# Patient Record
Sex: FEMALE | Race: BLACK OR AFRICAN AMERICAN | Employment: PART TIME | URBAN - METROPOLITAN AREA
[De-identification: names, ages, dates, MRNs, and addresses within clinical notes are randomized per-mention and may not be internally consistent; named-entity substitution may affect disease eponyms.]

---

## 2017-01-11 ENCOUNTER — HOSPITAL ENCOUNTER (OUTPATIENT)
Dept: MAMMOGRAPHY | Age: 72
Discharge: HOME | End: 2017-01-11
Attending: FAMILY MEDICINE
Payer: MEDICARE

## 2017-01-11 DIAGNOSIS — Z12.31 VISIT FOR SCREENING MAMMOGRAM: ICD-10-CM

## 2017-01-11 PROCEDURE — 77063 BREAST TOMOSYNTHESIS BI: CPT

## 2017-07-06 ENCOUNTER — HOSPITAL ENCOUNTER (OUTPATIENT)
Dept: MAMMOGRAPHY | Age: 72
Discharge: HOME | End: 2017-07-06
Attending: FAMILY MEDICINE
Payer: MEDICARE

## 2017-07-06 DIAGNOSIS — Z78.0 MENOPAUSE: ICD-10-CM

## 2017-07-06 PROCEDURE — 77080 DXA BONE DENSITY AXIAL: CPT

## 2018-03-01 ENCOUNTER — HOSPITAL ENCOUNTER (OUTPATIENT)
Dept: MAMMOGRAPHY | Age: 73
Discharge: HOME | End: 2018-03-01
Attending: FAMILY MEDICINE
Payer: MEDICARE

## 2018-03-01 DIAGNOSIS — Z12.31 VISIT FOR SCREENING MAMMOGRAM: ICD-10-CM

## 2018-03-01 PROCEDURE — 77063 BREAST TOMOSYNTHESIS BI: CPT

## 2018-04-30 ENCOUNTER — HOSPITAL ENCOUNTER (OUTPATIENT)
Dept: GENERAL RADIOLOGY | Age: 73
Discharge: HOME | End: 2018-04-30
Payer: MEDICARE

## 2018-04-30 DIAGNOSIS — M79.642 LEFT HAND PAIN: ICD-10-CM

## 2018-04-30 DIAGNOSIS — M79.641 RIGHT HAND PAIN: ICD-10-CM

## 2018-04-30 PROCEDURE — 72052 X-RAY EXAM NECK SPINE 6/>VWS: CPT

## 2018-04-30 PROCEDURE — 73130 X-RAY EXAM OF HAND: CPT

## 2018-06-19 ENCOUNTER — HOSPITAL ENCOUNTER (OUTPATIENT)
Dept: PREADMISSION TESTING | Age: 73
Discharge: HOME | End: 2018-06-19
Payer: MEDICARE

## 2018-06-19 DIAGNOSIS — Z01.818 PRE-OP TESTING: ICD-10-CM

## 2018-06-19 LAB
ALBUMIN SERPL-MCNC: 3.9 G/DL (ref 3.4–5)
ALBUMIN/GLOB SERPL: 1.1 {RATIO} (ref 0.8–1.7)
ALP SERPL-CCNC: 73 U/L (ref 45–117)
ALT SERPL-CCNC: 26 U/L (ref 13–56)
ANION GAP SERPL CALC-SCNC: 9 MMOL/L (ref 3–18)
AST SERPL-CCNC: 25 U/L (ref 15–37)
ATRIAL RATE: 57 BPM
BILIRUB SERPL-MCNC: 0.5 MG/DL (ref 0.2–1)
BUN SERPL-MCNC: 16 MG/DL (ref 7–18)
BUN/CREAT SERPL: 16 (ref 12–20)
CALCIUM SERPL-MCNC: 9.5 MG/DL (ref 8.5–10.1)
CALCULATED P AXIS, ECG09: 87 DEGREES
CALCULATED R AXIS, ECG10: 73 DEGREES
CALCULATED T AXIS, ECG11: 105 DEGREES
CHLORIDE SERPL-SCNC: 102 MMOL/L (ref 100–108)
CO2 SERPL-SCNC: 32 MMOL/L (ref 21–32)
CREAT SERPL-MCNC: 0.97 MG/DL (ref 0.6–1.3)
DIAGNOSIS, 93000: NORMAL
ERYTHROCYTE [DISTWIDTH] IN BLOOD BY AUTOMATED COUNT: 12.4 % (ref 11.6–14.5)
GLOBULIN SER CALC-MCNC: 3.7 G/DL (ref 2–4)
GLUCOSE SERPL-MCNC: 89 MG/DL (ref 74–99)
HCT VFR BLD AUTO: 38.5 % (ref 35–45)
HGB BLD-MCNC: 12.6 G/DL (ref 12–16)
MCH RBC QN AUTO: 31 PG (ref 24–34)
MCHC RBC AUTO-ENTMCNC: 32.7 G/DL (ref 31–37)
MCV RBC AUTO: 94.8 FL (ref 74–97)
P-R INTERVAL, ECG05: 216 MS
PLATELET # BLD AUTO: 202 K/UL (ref 135–420)
PMV BLD AUTO: 10 FL (ref 9.2–11.8)
POTASSIUM SERPL-SCNC: 3.2 MMOL/L (ref 3.5–5.5)
PROT SERPL-MCNC: 7.6 G/DL (ref 6.4–8.2)
Q-T INTERVAL, ECG07: 452 MS
QRS DURATION, ECG06: 90 MS
QTC CALCULATION (BEZET), ECG08: 439 MS
RBC # BLD AUTO: 4.06 M/UL (ref 4.2–5.3)
SODIUM SERPL-SCNC: 143 MMOL/L (ref 136–145)
VENTRICULAR RATE, ECG03: 57 BPM
WBC # BLD AUTO: 5.5 K/UL (ref 4.6–13.2)

## 2018-06-19 PROCEDURE — 93005 ELECTROCARDIOGRAM TRACING: CPT

## 2018-06-19 PROCEDURE — 36415 COLL VENOUS BLD VENIPUNCTURE: CPT | Performed by: ORTHOPAEDIC SURGERY

## 2018-06-19 PROCEDURE — 85027 COMPLETE CBC AUTOMATED: CPT | Performed by: ORTHOPAEDIC SURGERY

## 2018-06-19 PROCEDURE — 80053 COMPREHEN METABOLIC PANEL: CPT | Performed by: ORTHOPAEDIC SURGERY

## 2018-06-26 PROBLEM — I10 HTN (HYPERTENSION): Chronic | Status: ACTIVE | Noted: 2018-06-26

## 2018-06-26 PROBLEM — E78.00 HIGH CHOLESTEROL: Chronic | Status: ACTIVE | Noted: 2018-06-26

## 2018-06-26 PROBLEM — G56.02 CARPAL TUNNEL SYNDROME OF LEFT WRIST: Chronic | Status: ACTIVE | Noted: 2018-06-26

## 2018-07-26 ENCOUNTER — HOSPITAL ENCOUNTER (OUTPATIENT)
Dept: PREADMISSION TESTING | Age: 73
Discharge: HOME | End: 2018-07-26
Payer: MEDICARE

## 2018-07-26 LAB
ALBUMIN SERPL-MCNC: 3.9 G/DL (ref 3.4–5)
ALBUMIN/GLOB SERPL: 1.1 {RATIO} (ref 0.8–1.7)
ALP SERPL-CCNC: 72 U/L (ref 45–117)
ALT SERPL-CCNC: 28 U/L (ref 13–56)
ANION GAP SERPL CALC-SCNC: 1 MMOL/L (ref 3–18)
AST SERPL-CCNC: 27 U/L (ref 15–37)
BILIRUB SERPL-MCNC: 0.6 MG/DL (ref 0.2–1)
BUN SERPL-MCNC: 17 MG/DL (ref 7–18)
BUN/CREAT SERPL: 16 (ref 12–20)
CALCIUM SERPL-MCNC: 9.8 MG/DL (ref 8.5–10.1)
CHLORIDE SERPL-SCNC: 98 MMOL/L (ref 100–108)
CO2 SERPL-SCNC: 36 MMOL/L (ref 21–32)
CREAT SERPL-MCNC: 1.06 MG/DL (ref 0.6–1.3)
GLOBULIN SER CALC-MCNC: 3.6 G/DL (ref 2–4)
GLUCOSE SERPL-MCNC: 90 MG/DL (ref 74–99)
POTASSIUM SERPL-SCNC: 3.6 MMOL/L (ref 3.5–5.5)
PROT SERPL-MCNC: 7.5 G/DL (ref 6.4–8.2)
SODIUM SERPL-SCNC: 135 MMOL/L (ref 136–145)

## 2018-07-26 PROCEDURE — 36415 COLL VENOUS BLD VENIPUNCTURE: CPT | Performed by: ORTHOPAEDIC SURGERY

## 2018-07-26 PROCEDURE — 80053 COMPREHEN METABOLIC PANEL: CPT | Performed by: ORTHOPAEDIC SURGERY

## 2018-08-07 NOTE — H&P
History and Physical        Patient: Ada Delvalle               Sex: female          DOA: (Not on file)         YOB: 1945      Age:  75 y.o.        LOS:  LOS: 0 days        HPI:     Negro Robles is a 78-year-old right-handed white female referred here  for a left-greater-than-right severe carpal tunnel syndrome. The patient is here today for evaluation and treatment. She has had recent EMGs that show evidence of severe carpal tunnel syndrome bilaterally and evidence of denervation. The left hand is worse than the right. It does wake her up every morning around 4:00. X-rays of the bilateral wrists reviewed from outside show mild evidence of 1st CMC degenerative joint disease. Past Medical History:   Diagnosis Date    Arthritis     Heart murmur     congenital    Hypercholesteremia     Hypertension     early age 44s   [employer] Menopause     Ag 61       Past Surgical History:   Procedure Laterality Date    HX CHOLECYSTECTOMY      HX GYN      ovarian cyst right removed    HX GYN      uterine polyp removed       No family history on file. Social History     Social History    Marital status:      Spouse name: N/A    Number of children: N/A    Years of education: N/A     Social History Main Topics    Smoking status: Never Smoker    Smokeless tobacco: Never Used    Alcohol use 0.6 oz/week     1 Cans of beer per week      Comment: monthly    Drug use: No    Sexual activity: Not on file     Other Topics Concern    Not on file     Social History Narrative       Prior to Admission medications    Medication Sig Start Date End Date Taking? Authorizing Provider   chlorthalidone (HYGROTEN) 25 mg tablet nightly. Indications: hypertension 4/30/18   Historical Provider   ezetimibe (ZETIA) 10 mg tablet nightly. Indications: hypercholesterolemia 4/29/18   Historical Provider   fosinopril (MONOPRIL) 20 mg tablet nightly.  Indications: hypertension 5/31/18   Historical Provider multivitamin, tx-iron-ca-min (THERA-M W/ IRON) 9 mg iron-400 mcg tab tablet Take 1 Tab by mouth daily. Historical Provider   glucosamine-chondroit-vit c-mn (GLUCOSAMINE 1500 COMPLEX) 500-400 mg capsule Take  by mouth daily. Historical Provider   aspirin delayed-release 81 mg tablet Take  by mouth daily. Historical Provider       No Known Allergies    Review of Systems    Pertinent positives include heart murmur, joint stiffness and numbness/tingling. Pertinent negatives include blurred vision, chest pain, chills, cold, discharge of the eyes, dizziness, double vision, fever, headache, hearing loss, itching of the eyes, palpitations, redness of the eyes, rheumatic fever, ringing in ears, sore throat/hoarseness, weight change, abdominal pain, anxiety, bipolar disorder, bladder/kidney infection, bloody stool, blood in urine, burning sensation, changes in mood, chronic cough, depression, diarrhea, difficulty breathing, difficulty swallowing, fainting, frequent urinating, fracture/dislocation, gas/bloating, gout, hemorrhoids, incontinence, joint pain, loss of balance, memory loss, muscle weakness, nausea/vomiting, pain on breathing, painful urination, psoriasis, rash/itching, Raynaud's phenomenon, rheumatoid disease, seizure disorder, shortness of breath, sprain/strain, swelling of feet, tendonitis, varicose veins and wheezing. Physical Exam:      There were no vitals taken for this visit. Physical Exam:  Physical exam shows a healthy-appearing 67-year-old [employer] female. The left hand has decreased light touch sensation in digits 1, 2, 3, and 4. She has a mildly positive Tinel's that is more localized to the palm. She has some atrophy of the thenar eminence. Otherwise, examination shows that the patients left wrist demonstrates no obvious swelling, ecchymosis, or wounds noted. There is no tenderness to palpation anywhere within the wrist or hand.   The patient has normal motion in all six directions. The patient has a negative Phalen and direct carpal compression tests. The patient has a negative Finkelstein maneuver. The patient has good capillary refill. The patient has good  strength of the hand. Assessment/Plan     Principal Problem:    HTN (hypertension) (6/26/2018)    Active Problems:    High cholesterol (6/26/2018)      Carpal tunnel syndrome of left wrist (6/26/2018)      Dr. Lamin Francois asked her to see his  for a left endoscopic carpal tunnel release. He talked to her about the risks, the alternatives, and the benefits including infection, pain, and bleeding, and she is willing to proceed. Dr. Lamin Francois issued Shima Vu for postoperative pain use. He will see her again one week postop. Once we get her left side better enough we will discuss doing the right.

## 2018-08-08 ENCOUNTER — ANESTHESIA EVENT (OUTPATIENT)
Dept: SURGERY | Age: 73
End: 2018-08-08
Payer: MEDICARE

## 2018-08-08 RX ORDER — SODIUM CHLORIDE 0.9 % (FLUSH) 0.9 %
5-10 SYRINGE (ML) INJECTION AS NEEDED
Status: CANCELLED | OUTPATIENT
Start: 2018-08-08

## 2018-08-08 RX ORDER — SODIUM CHLORIDE 0.9 % (FLUSH) 0.9 %
5-10 SYRINGE (ML) INJECTION EVERY 8 HOURS
Status: CANCELLED | OUTPATIENT
Start: 2018-08-08

## 2018-08-08 RX ORDER — DIPHENHYDRAMINE HCL 25 MG
25 CAPSULE ORAL
Status: CANCELLED | OUTPATIENT
Start: 2018-08-08

## 2018-08-09 ENCOUNTER — HOSPITAL ENCOUNTER (OUTPATIENT)
Age: 73
Setting detail: OUTPATIENT SURGERY
Discharge: HOME | End: 2018-08-09
Attending: ORTHOPAEDIC SURGERY | Admitting: ORTHOPAEDIC SURGERY
Payer: MEDICARE

## 2018-08-09 ENCOUNTER — ANESTHESIA (OUTPATIENT)
Dept: SURGERY | Age: 73
End: 2018-08-09
Payer: MEDICARE

## 2018-08-09 VITALS
SYSTOLIC BLOOD PRESSURE: 128 MMHG | TEMPERATURE: 97 F | RESPIRATION RATE: 16 BRPM | HEART RATE: 76 BPM | OXYGEN SATURATION: 97 % | BODY MASS INDEX: 27.92 KG/M2 | DIASTOLIC BLOOD PRESSURE: 60 MMHG | WEIGHT: 177.9 LBS | HEIGHT: 67 IN

## 2018-08-09 PROCEDURE — 76010000154 HC OR TIME FIRST 0.5 HR: Performed by: ORTHOPAEDIC SURGERY

## 2018-08-09 PROCEDURE — 77030032490 HC SLV COMPR SCD KNE COVD -B: Performed by: ORTHOPAEDIC SURGERY

## 2018-08-09 PROCEDURE — 74011250636 HC RX REV CODE- 250/636: Performed by: PHYSICIAN ASSISTANT

## 2018-08-09 PROCEDURE — 74011250636 HC RX REV CODE- 250/636: Performed by: SPECIALIST

## 2018-08-09 PROCEDURE — 76060000031 HC ANESTHESIA FIRST 0.5 HR: Performed by: ORTHOPAEDIC SURGERY

## 2018-08-09 PROCEDURE — 74011000250 HC RX REV CODE- 250

## 2018-08-09 PROCEDURE — 74011000250 HC RX REV CODE- 250: Performed by: ORTHOPAEDIC SURGERY

## 2018-08-09 PROCEDURE — 76210000016 HC OR PH I REC 1 TO 1.5 HR: Performed by: ORTHOPAEDIC SURGERY

## 2018-08-09 PROCEDURE — 76210000021 HC REC RM PH II 0.5 TO 1 HR: Performed by: ORTHOPAEDIC SURGERY

## 2018-08-09 PROCEDURE — 74011250636 HC RX REV CODE- 250/636

## 2018-08-09 PROCEDURE — 77030000032 HC CUF TRNQT ZIMM -B: Performed by: ORTHOPAEDIC SURGERY

## 2018-08-09 PROCEDURE — 74011250637 HC RX REV CODE- 250/637: Performed by: SPECIALIST

## 2018-08-09 PROCEDURE — 77030020782 HC GWN BAIR PAWS FLX 3M -B: Performed by: ORTHOPAEDIC SURGERY

## 2018-08-09 PROCEDURE — 77030009770 HC INSTRU CRPL SET CNMD -C: Performed by: ORTHOPAEDIC SURGERY

## 2018-08-09 RX ORDER — LIDOCAINE HYDROCHLORIDE 20 MG/ML
INJECTION, SOLUTION EPIDURAL; INFILTRATION; INTRACAUDAL; PERINEURAL AS NEEDED
Status: DISCONTINUED | OUTPATIENT
Start: 2018-08-09 | End: 2018-08-09 | Stop reason: HOSPADM

## 2018-08-09 RX ORDER — ONDANSETRON 2 MG/ML
INJECTION INTRAMUSCULAR; INTRAVENOUS AS NEEDED
Status: DISCONTINUED | OUTPATIENT
Start: 2018-08-09 | End: 2018-08-09 | Stop reason: HOSPADM

## 2018-08-09 RX ORDER — DEXAMETHASONE SODIUM PHOSPHATE 4 MG/ML
8 INJECTION, SOLUTION INTRA-ARTICULAR; INTRALESIONAL; INTRAMUSCULAR; INTRAVENOUS; SOFT TISSUE ONCE
Status: COMPLETED | OUTPATIENT
Start: 2018-08-09 | End: 2018-08-09

## 2018-08-09 RX ORDER — ACETAMINOPHEN 500 MG
1000 TABLET ORAL ONCE
Status: COMPLETED | OUTPATIENT
Start: 2018-08-09 | End: 2018-08-09

## 2018-08-09 RX ORDER — SODIUM CHLORIDE, SODIUM LACTATE, POTASSIUM CHLORIDE, CALCIUM CHLORIDE 600; 310; 30; 20 MG/100ML; MG/100ML; MG/100ML; MG/100ML
125 INJECTION, SOLUTION INTRAVENOUS CONTINUOUS
Status: DISCONTINUED | OUTPATIENT
Start: 2018-08-09 | End: 2018-08-09 | Stop reason: HOSPADM

## 2018-08-09 RX ORDER — DEXTROSE 50 % IN WATER (D50W) INTRAVENOUS SYRINGE
25-50 AS NEEDED
Status: DISCONTINUED | OUTPATIENT
Start: 2018-08-09 | End: 2018-08-09 | Stop reason: HOSPADM

## 2018-08-09 RX ORDER — FENTANYL CITRATE 50 UG/ML
50 INJECTION, SOLUTION INTRAMUSCULAR; INTRAVENOUS
Status: DISCONTINUED | OUTPATIENT
Start: 2018-08-09 | End: 2018-08-09 | Stop reason: HOSPADM

## 2018-08-09 RX ORDER — ONDANSETRON 2 MG/ML
4 INJECTION INTRAMUSCULAR; INTRAVENOUS ONCE
Status: DISCONTINUED | OUTPATIENT
Start: 2018-08-09 | End: 2018-08-09 | Stop reason: HOSPADM

## 2018-08-09 RX ORDER — GLYCOPYRROLATE 0.2 MG/ML
INJECTION INTRAMUSCULAR; INTRAVENOUS AS NEEDED
Status: DISCONTINUED | OUTPATIENT
Start: 2018-08-09 | End: 2018-08-09 | Stop reason: HOSPADM

## 2018-08-09 RX ORDER — SODIUM CHLORIDE 0.9 % (FLUSH) 0.9 %
5-10 SYRINGE (ML) INJECTION AS NEEDED
Status: DISCONTINUED | OUTPATIENT
Start: 2018-08-09 | End: 2018-08-09 | Stop reason: HOSPADM

## 2018-08-09 RX ORDER — NALOXONE HYDROCHLORIDE 0.4 MG/ML
0.1 INJECTION, SOLUTION INTRAMUSCULAR; INTRAVENOUS; SUBCUTANEOUS AS NEEDED
Status: DISCONTINUED | OUTPATIENT
Start: 2018-08-09 | End: 2018-08-09 | Stop reason: HOSPADM

## 2018-08-09 RX ORDER — BUPIVACAINE HYDROCHLORIDE 2.5 MG/ML
INJECTION, SOLUTION EPIDURAL; INFILTRATION; INTRACAUDAL AS NEEDED
Status: DISCONTINUED | OUTPATIENT
Start: 2018-08-09 | End: 2018-08-09 | Stop reason: HOSPADM

## 2018-08-09 RX ORDER — FENTANYL CITRATE 50 UG/ML
25 INJECTION, SOLUTION INTRAMUSCULAR; INTRAVENOUS AS NEEDED
Status: DISCONTINUED | OUTPATIENT
Start: 2018-08-09 | End: 2018-08-09 | Stop reason: HOSPADM

## 2018-08-09 RX ORDER — MIDAZOLAM HYDROCHLORIDE 1 MG/ML
INJECTION, SOLUTION INTRAMUSCULAR; INTRAVENOUS AS NEEDED
Status: DISCONTINUED | OUTPATIENT
Start: 2018-08-09 | End: 2018-08-09 | Stop reason: HOSPADM

## 2018-08-09 RX ORDER — SODIUM CHLORIDE 9 MG/ML
125 INJECTION, SOLUTION INTRAVENOUS CONTINUOUS
Status: DISCONTINUED | OUTPATIENT
Start: 2018-08-09 | End: 2018-08-09 | Stop reason: HOSPADM

## 2018-08-09 RX ORDER — MAGNESIUM SULFATE 100 %
4 CRYSTALS MISCELLANEOUS AS NEEDED
Status: DISCONTINUED | OUTPATIENT
Start: 2018-08-09 | End: 2018-08-09 | Stop reason: HOSPADM

## 2018-08-09 RX ORDER — PROPOFOL 10 MG/ML
INJECTION, EMULSION INTRAVENOUS AS NEEDED
Status: DISCONTINUED | OUTPATIENT
Start: 2018-08-09 | End: 2018-08-09 | Stop reason: HOSPADM

## 2018-08-09 RX ADMIN — SODIUM CHLORIDE, SODIUM LACTATE, POTASSIUM CHLORIDE, AND CALCIUM CHLORIDE: 600; 310; 30; 20 INJECTION, SOLUTION INTRAVENOUS at 11:26

## 2018-08-09 RX ADMIN — LIDOCAINE HYDROCHLORIDE 80 MG: 20 INJECTION, SOLUTION EPIDURAL; INFILTRATION; INTRACAUDAL; PERINEURAL at 11:12

## 2018-08-09 RX ADMIN — ONDANSETRON 4 MG: 2 INJECTION INTRAMUSCULAR; INTRAVENOUS at 11:05

## 2018-08-09 RX ADMIN — PROPOFOL 80 MG: 10 INJECTION, EMULSION INTRAVENOUS at 11:12

## 2018-08-09 RX ADMIN — DEXAMETHASONE SODIUM PHOSPHATE 8 MG: 4 INJECTION, SOLUTION INTRAMUSCULAR; INTRAVENOUS at 07:42

## 2018-08-09 RX ADMIN — PROPOFOL 20 MG: 10 INJECTION, EMULSION INTRAVENOUS at 11:10

## 2018-08-09 RX ADMIN — ACETAMINOPHEN 1000 MG: 500 TABLET, FILM COATED ORAL at 07:44

## 2018-08-09 RX ADMIN — SODIUM CHLORIDE, SODIUM LACTATE, POTASSIUM CHLORIDE, AND CALCIUM CHLORIDE 125 ML/HR: 600; 310; 30; 20 INJECTION, SOLUTION INTRAVENOUS at 07:41

## 2018-08-09 RX ADMIN — LIDOCAINE HYDROCHLORIDE 20 MG: 20 INJECTION, SOLUTION EPIDURAL; INFILTRATION; INTRACAUDAL; PERINEURAL at 11:10

## 2018-08-09 RX ADMIN — MIDAZOLAM HYDROCHLORIDE 1 MG: 1 INJECTION, SOLUTION INTRAMUSCULAR; INTRAVENOUS at 11:05

## 2018-08-09 RX ADMIN — GLYCOPYRROLATE 0.2 MG: 0.2 INJECTION INTRAMUSCULAR; INTRAVENOUS at 11:05

## 2018-08-09 NOTE — INTERVAL H&P NOTE
H&P Update:  Jason Carrera was seen and examined. History and physical has been reviewed. The patient has been examined. There have been no significant clinical changes since the completion of the originally dated History and Physical.  Patient identified by surgeon; surgical site was confirmed by patient and surgeon.     Signed By: Alessandra Lucero MD     August 9, 2018 9:53 AM

## 2018-08-09 NOTE — ANESTHESIA POSTPROCEDURE EVALUATION
Post-Anesthesia Evaluation and Assessment    Cardiovascular Function/Vital Signs  Visit Vitals    /67    Pulse 84    Temp 36.2 °C (97.1 °F)    Resp 18    Ht 5' 7\" (1.702 m)    Wt 80.7 kg (177 lb 14.4 oz)    SpO2 100%    BMI 27.86 kg/m2       Patient is status post Procedure(s):  LEFT ENDOSCOPIC CARPAL TUNNEL RELEASE. Nausea/Vomiting: Controlled. Postoperative hydration reviewed and adequate. Pain:  Pain Scale 1: Visual (08/09/18 1150)  Pain Intensity 1: 0 (08/09/18 1150)   Managed. Neurological Status:   Neuro (WDL): Within Defined Limits (08/09/18 1150)   At baseline. Mental Status and Level of Consciousness: Arousable. Pulmonary Status:   O2 Device: Room air (08/09/18 1150)   Adequate oxygenation and airway patent. Complications related to anesthesia: None    Post-anesthesia assessment completed. No concerns. Patient has met all discharge requirements.     Signed By: Lary Lake MD    August 9, 2018

## 2018-08-09 NOTE — ANESTHESIA PREPROCEDURE EVALUATION
Anesthetic History   No history of anesthetic complications     Pertinent negatives: No PONV       Review of Systems / Medical History  Patient summary reviewed, nursing notes reviewed and pertinent labs reviewed    Pulmonary  Within defined limits            Pertinent negatives: No smoker     Neuro/Psych   Within defined limits           Cardiovascular    Hypertension: well controlled                   GI/Hepatic/Renal  Within defined limits              Endo/Other  Within defined limits           Other Findings   Comments: etoh 1/ month           Physical Exam    Airway  Mallampati: IV  TM Distance: 4 - 6 cm  Neck ROM: normal range of motion   Mouth opening: Normal     Cardiovascular               Dental    Dentition: Full upper dentures     Pulmonary                 Abdominal         Other Findings            Anesthetic Plan    ASA: 2  Anesthesia type: general          Induction: Intravenous  Anesthetic plan and risks discussed with: Patient

## 2018-08-09 NOTE — PERIOP NOTES
Reviewed PTA medication list with patient/caregiver and patient/caregiver denies any additional medications. Patient admits to having a responsible adult care for them for at least 24 hours after surgery.     Dual skin assessment completed by Kacie Mae RN and Ken Merchant RN.

## 2018-08-09 NOTE — DISCHARGE INSTRUCTIONS
Izzy Joshi III, MD Anton Mare, PA-C    Upper Extremity Surgery  Discharge Instructions      Please take the time to review the following instructions before you leave the hospital and use them as guidelines during your recovery from surgery. If you have any questions you may contact my office at [phone]. Wound Care/Dressing Changes:    []   You may remove the bulky dressing two days after surgery. Once you remove this, no dressing is necessary if there is no drainage. [x]   You may change your dressing as needed. Beginning the 2 days after you are discharged from the hospital you should change your dressing daily. A big, bulky dressing isnt necessary as long as there is any drainage from the incisions. You can put a band-aid or a piece of gauze over each incision and wear an ACE bandage as needed for comfort and swelling. []   Dont remove your dressing or get them wet.  It isnt necessary to apply antibiotic ointment to your incisions. Sutures will be removed at your one week post-op visit. Staples (if you have them) are removed in two weeks. If you have steri-strips over your incision they will start to peel off in 7-10 days as you get them wet. They dont need to be removed prior to that. When they begin to peel off, you may remove them. They should all be removed by 14 days from your surgery. Showering/Bathing:    [x]   You may shower 2 days after your surgery. Your dressing may be removed for showering. You may get your incisions wet in the shower. Dont vigorously scrub your incisions. Apply a clean, dry dressing after you have dried your incisions. Do not take a bath or get into a swimming pool or Jacuzzi until the incisions are completely healed. This may take about 14 days. Do not soak your incision under water. Sling:    []   You are not required to wear your sling and should do so only as needed for comfort.  You have no restrictions with regards to the movement of your shoulder. Please push to achieve full range of motion as soon as possible. You may resume your normal daily activities immediately and return to work as soon as you feel appropriate.    []   Keep your arm in the immobilizer at all times except when showering and changing your clothes. When showering or changing, keep your arm at your side. Dont move it away from your body. []   Keep your arm in the immobilizer at all times except when showering, changing your clothes and doing the exercises shown to you by Dr. Darylene Reusing or your physical therapist prior to your discharge from the hospital.  Keep your arm at your side when changing your clothes and showering. Dont move it away from your body. Ice/Elevation    Continue ice consistently for 48 hours after surgery. After 48 hours, you should ice your shoulder 3 times per day, for 20 minutes at a time for the next 5 days. After one week from surgery, you may use ice as needed for pain and swelling. Diet:    You may advance to your regular diet as tolerated. Medication:    1. You will be given a prescription for pain medication when you are discharged from the hospital.  Take the medication as needed according to the directions on the prescription bottle. Possible side effects of the medication include dizziness, headache, nausea, vomiting, constipation and urinary retention. If you experience any of these side effects call the office so that we can assist you in relieving them. Discontinue the use of the pain medication if you develop itching, rash, shortness of breath or difficulties swallowing. If these symptoms become severe or arent relieved by discontinuing the medication you should seek immediate medical attention. Refills of pain medication are authorized during office hours only (8 AM-5PM Mon. thru Fri.).    2. If you were prescribed Percoset/oxycodone or Dilaudid/hydromorphone you must have a written prescription. These medications legally cannot be called in to the pharmacy. 3. You may take over the counter Ibuprofen/Advil/Aleve between dosages of your pain medication if needed. Do not take Tylenol in addition to your pain medication as most of the pain medication already contains Tylenol. Do not exceed 3000mg of Tylenol per day. Ex: (hydrocodone 5/325g= 325mg of Tylenol)  4. You may resume the medication you were taking prior to surgery. Pain medication may change the effects of any antidepressant medication you are taking. If you have any questions about possible interactions between your regular medications and the pain medication you should consult the physician who prescribes your regular medications. Follow Up Appointment:  If you are unsure of your follow-up appointment date and time, please call [phone]. Please let our  know you are scheduling a post-op appointment. Most appointments should be between 7-14 days after your surgery. Physical Therapy:    []    If you already have a therapy appointment, please be sure to attend your sessions as scheduled. []   Physical Therapy will be discussed with you at your first follow-up appointment with Dr. Catrina Garcia. You dont need to begin physical therapy prior to that.    []  Begin physical therapy with your Home Health Physical Therapy. This will be set up         for your before you leave the hospital.    [x]  You do not require Physical Therapy. Important Signs and Symptoms:    If any of the following signs and symptoms occurs, you should contact Dr. Catrina Garcia office. Please be advised if a problem arises which you feel requires immediate medical attention or you are unable to contact Dr. Catrina Garcia office you should seek immediate medical attention. Signs and symptoms to watch for include:    1.  A sudden increase in swelling and/or redness or warmth at the area your surgery was performed which isnt relieved by rest, ice, and elevation. 2. Oral temperature greater than 101 degrees for 12 hours or more which isnt relieved by an increase in fluid intake and taking two Tylenol every 4-6 hours. 3. Excessive drainage from your incisions, or drainage which hasnt stopped by 72 hours after your surgery. 4. Fever, chills, shortness of breath, chest pain, nausea, vomiting or other signs and symptoms which are of concern to you. DISCHARGE SUMMARY from Nurse    PATIENT INSTRUCTIONS:    After general anesthesia or intravenous sedation, for 24 hours or while taking prescription Narcotics:  · Limit your activities  · Do not drive and operate hazardous machinery  · Do not make important personal or business decisions  · Do  not drink alcoholic beverages  · If you have not urinated within 8 hours after discharge, please contact your surgeon on call. Report the following to your surgeon:  · Excessive pain, swelling, redness or odor of or around the surgical area  · Temperature over 100.5  · Nausea and vomiting lasting longer than 4 hours or if unable to take medications  · Any signs of decreased circulation or nerve impairment to extremity: change in color, persistent  numbness, tingling, coldness or increase pain  · Any questions    What to do at Home:  Recommended activity: Activity as tolerated and no driving for today, Ambulate in house, No lifting, Driving, or Strenuous exercise until advised and No driving while on analgesics. If you experience any of the following symptoms as above, please follow up with Dr. Catrina Garcia. *  Please give a list of your current medications to your Primary Care Provider. *  Please update this list whenever your medications are discontinued, doses are      changed, or new medications (including over-the-counter products) are added. *  Please carry medication information at all times in case of emergency situations.     These are general instructions for a healthy lifestyle:    No smoking/ No tobacco products/ Avoid exposure to second hand smoke  Surgeon General's Warning:  Quitting smoking now greatly reduces serious risk to your health. Obesity, smoking, and sedentary lifestyle greatly increases your risk for illness    A healthy diet, regular physical exercise & weight monitoring are important for maintaining a healthy lifestyle    You may be retaining fluid if you have a history of heart failure or if you experience any of the following symptoms:  Weight gain of 3 pounds or more overnight or 5 pounds in a week, increased swelling in our hands or feet or shortness of breath while lying flat in bed. Please call your doctor as soon as you notice any of these symptoms; do not wait until your next office visit. Recognize signs and symptoms of STROKE:    F-face looks uneven    A-arms unable to move or move unevenly    S-speech slurred or non-existent    T-time-call 911 as soon as signs and symptoms begin-DO NOT go       Back to bed or wait to see if you get better-TIME IS BRAIN. Warning Signs of HEART ATTACK     Call 911 if you have these symptoms:   Chest discomfort. Most heart attacks involve discomfort in the center of the chest that lasts more than a few minutes, or that goes away and comes back. It can feel like uncomfortable pressure, squeezing, fullness, or pain.  Discomfort in other areas of the upper body. Symptoms can include pain or discomfort in one or both arms, the back, neck, jaw, or stomach.  Shortness of breath with or without chest discomfort.  Other signs may include breaking out in a cold sweat, nausea, or lightheadedness. Don't wait more than five minutes to call 911 - MINUTES MATTER! Fast action can save your life. Calling 911 is almost always the fastest way to get lifesaving treatment. Emergency Medical Services staff can begin treatment when they arrive -- up to an hour sooner than if someone gets to the hospital by car.      The discharge information has been reviewed with the patient and caregiver. The patient and caregiver verbalized understanding. Discharge medications reviewed with the patient and caregiver and appropriate educational materials and side effects teaching were provided.   ___________________________________________________________________________________________________________________________________    Patient armband removed and shredded

## 2018-08-09 NOTE — IP AVS SNAPSHOT
[address] [address] 
[phone] Patient: Sade Gastelum MRN: [id number] About your hospitalization You were admitted on:  2018 You last received care in the:  THE [facility] PACU You were discharged on:  2018 Why you were hospitalized Your primary diagnosis was:  Htn (Hypertension) Your diagnoses also included:  High Cholesterol, Carpal Tunnel Syndrome Of Left Wrist  
  
Follow-up Information Follow up With Details Comments Contact Info Ramonita Lam, [address] [facility] [address] [phone] Randolph Vogt MD   [address] [facility] 1000 [facility] [address] [phone] Discharge Orders None A check yancy indicates which time of day the medication should be taken. My Medications CONTINUE taking these medications Instructions Each Dose to Equal  
 Morning Noon Evening Bedtime  
 aspirin delayed-release 81 mg tablet Your last dose was: Your next dose is: Take  by mouth daily. chlorthalidone 25 mg tablet Commonly known as:  Chloé Rawls Your last dose was: Your next dose is:    
   
   
 nightly. Indications: hypertension  
     
   
   
   
  
 ezetimibe 10 mg tablet Commonly known as:  Suellyn Luis Your last dose was: Your next dose is:    
   
   
 nightly. Indications: hypercholesterolemia  
     
   
   
   
  
 fosinopril 20 mg tablet Commonly known as:  MONOPRIL Your last dose was: Your next dose is:    
   
   
 nightly. Indications: hypertension GLUCOSAMINE 1500 COMPLEX 500-400 mg capsule Generic drug:  glucosamine-chondroit-vit c-mn Your last dose was: Your next dose is: Take  by mouth daily. multivitamin, tx-iron-ca-min 9 mg iron-400 mcg Tab tablet Commonly known as:  THERA-M w/ IRON Your last dose was: Your next dose is: Take 1 Tab by mouth daily. 1 Tab Discharge Instructions Juan Pablo Zavala III, MD Willeen Render, PA-C Upper Extremity Surgery Discharge Instructions Please take the time to review the following instructions before you leave the hospital and use them as guidelines during your recovery from surgery. If you have any questions you may contact my office at [phone]. Wound Care/Dressing Changes: 
 
[]   You may remove the bulky dressing two days after surgery. Once you remove this, no dressing is necessary if there is no drainage. [x]   You may change your dressing as needed. Beginning the 2 days after you are discharged from the hospital you should change your dressing daily. A big, bulky dressing isnt necessary as long as there is any drainage from the incisions. You can put a band-aid or a piece of gauze over each incision and wear an ACE bandage as needed for comfort and swelling. []   Dont remove your dressing or get them wet. ? It isnt necessary to apply antibiotic ointment to your incisions. Sutures will be removed at your one week post-op visit. Staples (if you have them) are removed in two weeks. If you have steri-strips over your incision they will start to peel off in 7-10 days as you get them wet. They dont need to be removed prior to that. When they begin to peel off, you may remove them. They should all be removed by 14 days from your surgery. Showering/Bathing: 
 
[x]   You may shower 2 days after your surgery. Your dressing may be removed for showering. You may get your incisions wet in the shower. Dont vigorously scrub your incisions. Apply a clean, dry dressing after you have dried your incisions.   Do not take a bath or get into a swimming pool or [place] until the incisions are completely healed. This may take about 14 days. Do not soak your incision under water. Sling: 
 
[]   You are not required to wear your sling and should do so only as needed for comfort. You have no restrictions with regards to the movement of your shoulder. Please push to achieve full range of motion as soon as possible. You may resume your normal daily activities immediately and return to work as soon as you feel appropriate. 
 
[]   Keep your arm in the immobilizer at all times except when showering and changing your clothes. When showering or changing, keep your arm at your side. Dont move it away from your body. []   Keep your arm in the immobilizer at all times except when showering, changing your clothes and doing the exercises shown to you by Dr. Stacy Langley or your physical therapist prior to your discharge from the hospital.  Keep your arm at your side when changing your clothes and showering. Dont move it away from your body. Ice/Elevation Continue ice consistently for 48 hours after surgery. After 48 hours, you should ice your shoulder 3 times per day, for 20 minutes at a time for the next 5 days. After one week from surgery, you may use ice as needed for pain and swelling. Diet: 
 
You may advance to your regular diet as tolerated. Medication: 
 
1. You will be given a prescription for pain medication when you are discharged from the hospital.  Take the medication as needed according to the directions on the prescription bottle. Possible side effects of the medication include dizziness, headache, nausea, vomiting, constipation and urinary retention. If you experience any of these side effects call the office so that we can assist you in relieving them. Discontinue the use of the pain medication if you develop itching, rash, shortness of breath or difficulties swallowing.   If these symptoms become severe or arent relieved by discontinuing the medication you should seek immediate medical attention. Refills of pain medication are authorized during office hours only (8 AM-5PM Mon. thru Fri.). 2. If you were prescribed Percoset/oxycodone or Dilaudid/hydromorphone you must have a written prescription. These medications legally cannot be called in to the pharmacy. 3. You may take over the counter Ibuprofen/Advil/Aleve between dosages of your pain medication if needed. Do not take Tylenol in addition to your pain medication as most of the pain medication already contains Tylenol. Do not exceed 3000mg of Tylenol per day. Ex: (hydrocodone 5/325g= 325mg of Tylenol) 4. You may resume the medication you were taking prior to surgery. Pain medication may change the effects of any antidepressant medication you are taking. If you have any questions about possible interactions between your regular medications and the pain medication you should consult the physician who prescribes your regular medications. Follow Up Appointment: If you are unsure of your follow-up appointment date and time, please call [phone]. Please let our  know you are scheduling a post-op appointment. Most appointments should be between 7-14 days after your surgery. Physical Therapy: 
 
[]    If you already have a therapy appointment, please be sure to attend your sessions as scheduled. []   Physical Therapy will be discussed with you at your first follow-up appointment with Dr. Aida Mcgovern. You dont need to begin physical therapy prior to that. 
 
[]  Begin physical therapy with your Home Health Physical Therapy. This will be set up         for your before you leave the hospital. 
 
[x]  You do not require Physical Therapy. Important Signs and Symptoms: 
 
If any of the following signs and symptoms occurs, you should contact Dr. Aida Mcgovern office.   Please be advised if a problem arises which you feel requires immediate medical attention or you are unable to contact Dr. Isa Self office you should seek immediate medical attention. Signs and symptoms to watch for include: 1. A sudden increase in swelling and/or redness or warmth at the area your surgery was performed which isnt relieved by rest, ice, and elevation. 2. Oral temperature greater than 101 degrees for 12 hours or more which isnt relieved by an increase in fluid intake and taking two Tylenol every 4-6 hours. 3. Excessive drainage from your incisions, or drainage which hasnt stopped by 72 hours after your surgery. 4. Fever, chills, shortness of breath, chest pain, nausea, vomiting or other signs and symptoms which are of concern to you. DISCHARGE SUMMARY from Nurse PATIENT INSTRUCTIONS: 
 
 
F-face looks uneven A-arms unable to move or move unevenly S-speech slurred or non-existent T-time-call 911 as soon as signs and symptoms begin-DO NOT go Back to bed or wait to see if you get better-TIME IS BRAIN. Warning Signs of HEART ATTACK Call 911 if you have these symptoms: 
? Chest discomfort. Most heart attacks involve discomfort in the center of the chest that lasts more than a few minutes, or that goes away and comes back. It can feel like uncomfortable pressure, squeezing, fullness, or pain. ? Discomfort in other areas of the upper body. Symptoms can include pain or discomfort in one or both arms, the back, neck, jaw, or stomach. ? Shortness of breath with or without chest discomfort. ? Other signs may include breaking out in a cold sweat, nausea, or lightheadedness. Don't wait more than five minutes to call [address]! Fast action can save your life. Calling 911 is almost always the fastest way to get lifesaving treatment. Emergency Medical Services staff can begin treatment when they arrive  up to an hour sooner than if someone gets to the hospital by car. The discharge information has been reviewed with the patient and caregiver. The patient and caregiver verbalized understanding. Discharge medications reviewed with the patient and caregiver and appropriate educational materials and side effects teaching were provided. ___________________________________________________________________________________________________________________________________ Patient armband removed and shredded Introducing [facility]! Dear Jose Koehler: Thank you for requesting a Yatra account. Our records indicate that you already have an active Yatra account. You can access your account anytime at [email or web address] Did you know that you can access your hospital and ER discharge instructions at any time in Yatra? You can also review all of your test results from your hospital stay or ER visit. Additional Information If you have questions, please visit the Frequently Asked Questions section of the Yatra website at [email or web address] Remember, Yatra is NOT to be used for urgent needs. For medical emergencies, dial 911. Now available from your iPhone and Android! Introducing Broderick Martinez As a [facility] patient, I wanted to make you aware of our electronic visit tool called Broderick Martinez. [facility] 24/7 allows you to connect within minutes with a medical provider 24 hours a day, seven days a week via a mobile device or tablet or logging into a secure website from your computer. You can access Broderick Martinez from anywhere in the [place]. A virtual visit might be right for you when you have a simple condition and feel like you just dont want to get out of bed, or cant get away from work for an appointment, when your regular [employer] provider is not available (evenings, weekends or holidays), or when youre out of town and need minor care. Electronic visits cost only $49 and if the [employer] 24/7 provider determines a prescription is needed to treat your condition, one can be electronically transmitted to a nearby pharmacy*. Please take a moment to enroll today if you have not already done so. The enrollment process is free and takes just a few minutes. To enroll, please download the [employer] 24/7 roe to your tablet or phone, or visit [email or web address] to enroll on your computer. And, as an [address] [place] [address] patient with a [employer] account, the results of your visits will be scanned into your electronic medical record and your primary care provider will be able to view the scanned results. We urge you to continue to see your regular [employer] provider for your ongoing medical care. And while your primary care provider may not be the one available when you seek a Broderick [employer] virtual visit, the peace of mind you get from getting a real diagnosis real time can be priceless. For more information on Broderick [employer], view our Frequently Asked Questions (FAQs) at [email or web address]. Sincerely, 
 
Nicolasa Davila MD 
Chief Medical Officer Shiraz8 Fiona Cristobla *:  certain medications cannot be prescribed via [employer] Providers Seen During Your Hospitalization Provider Specialty Primary office phone David Del Rio, [address] [facility] Orthopedic Surgery [phone] Your Primary Care Physician (PCP) Primary Care Physician Office Phone Office Fax Ilir Hernández [phone] You are allergic to the following No active allergies Recent Documentation Height Weight BMI OB Status Smoking Status 1.702 m 80.7 kg 27.86 kg/m2 Postmenopausal Never Smoker Emergency Contacts Name Discharge Info Relation Home Work Mobile Luciano Rodriguezj 50 CAREGIVER [3] Spouse [3] [phone] Patient Belongings The following personal items are in your possession at time of discharge: 
  Dental Appliances: Uppers (unable to find , removed and placed to Preopholiding safe, Pt is awar)  Visual Aid: Glasses, At home      Home Medications: None   Jewelry: None  Clothing: Pants, Shirt, Footwear, Undergarments (lcoker 15)    Other Valuables: None (states spouse has her cell) Please provide this summary of care documentation to your next provider. Signatures-by signing, you are acknowledging that this After Visit Summary has been reviewed with you and you have received a copy. Patient Signature:  ____________________________________________________________ Date:  ____________________________________________________________  
  
Farnaz Newcomer Provider Signature:  ____________________________________________________________ Date:  ____________________________________________________________

## 2018-08-09 NOTE — OP NOTES
Single Incision Endoscopic Carpal Tunnel Release    Patient: Roxanne Reis MRN: [id number]  CSN: [id number]   YOB: 1945  Age: 75 y.o. Sex: female      Date of Surgery:8/9/2018    PREOPERATIVE DIAGNOSES:  LEFT CARPAL TUNNEL SYNDROME      POSTOPERATIVE DIAGNOSES:  LEFT CARPAL TUNNEL SYNDROME    PROCEDURE: left Single Incision Endoscpoic Carpal Tunnel Release    SURGEON: Masha Retana MD    SPECIMEN TO PATHOLOGY:  * No specimens in log *    ESTIMATED BLOOD LOSS: none    FINDINGS:  Same     TOURNIQUET  TIME:   Approximately 5 minutes at 809 Hg     COMPLICATIONS:  None     ANESTHESIA:  General    INDICATIONS:  A 75y.o. year old female with known carpal tunnel syndrome documented by clinical exam and nerve conduction studies. The patient now presents for a ECTR. PROCEDURE: The patient was brought into the operating theater and after adequate prepping and draping of the surgical wrist and hand the limb was then exsanguinated with an Esmarch bandage and the tourniquet was inflated. .  A 1 cm transverse incision was placed at the volar flexion wrist crease just ulnar to the palmaris longus. The incision was deepened to the antebrachial fascia which was released the length of the wound and proximally for an additional 1 cm. Using the [facility] carpal tunnel release kit, the carpal tunnel was dilated in line with the fourth metacarpal to the distal extent of the transverse carpal ligament. The cannula was then inserted and kept on some proximal radial deviation with the wrist in slight extension, and it was passed all the way to the cardinal line of Pichardo. The scope was then inserted with the wrist in slight extension and the undersurface of the transverse carpal ligament was seen easily from proximal to the distal fatty/adipose tissue at the end of the transverse carpal ligament.   Using the [facility] on the Fanplayr and using the endoscope for visualization, the transverse carpal ligament was transected in two passes with good release of the ligament outside of view of the cannula. The nerve was never in harms way. The cannula was then withdrawn and placed back in the carpal tunnel with none of the prerelease tension. The wound was then closed with Mastisol on either side and then Steri-Strips were used to re-oppose the skin without the use of stitches. The skin and cut ends of the transverse carpal ligament were anesthetized with 4 to 5 mL of 0.25% Marcaine without epinephrine. This was dressed with two 4 x 4s and an Ace wrap. The tourniquet was deflated and removed, and the patient taken to the recovery room awake and in stable condition. All instrument, sponge and needle counts were correct.     Idania Brown MD  8/9/2018

## 2019-01-11 ENCOUNTER — HOSPITAL ENCOUNTER (OUTPATIENT)
Dept: PREADMISSION TESTING | Age: 74
Discharge: HOME | End: 2019-01-11
Payer: MEDICARE

## 2019-01-11 LAB
ANION GAP SERPL CALC-SCNC: 5 MMOL/L (ref 3–18)
BUN SERPL-MCNC: 14 MG/DL (ref 7–18)
BUN/CREAT SERPL: 14 (ref 12–20)
CALCIUM SERPL-MCNC: 9.4 MG/DL (ref 8.5–10.1)
CHLORIDE SERPL-SCNC: 103 MMOL/L (ref 100–108)
CO2 SERPL-SCNC: 32 MMOL/L (ref 21–32)
CREAT SERPL-MCNC: 0.99 MG/DL (ref 0.6–1.3)
ERYTHROCYTE [DISTWIDTH] IN BLOOD BY AUTOMATED COUNT: 12.4 % (ref 11.6–14.5)
GLUCOSE SERPL-MCNC: 82 MG/DL (ref 74–99)
HCT VFR BLD AUTO: 39 % (ref 35–45)
HGB BLD-MCNC: 12.8 G/DL (ref 12–16)
MCH RBC QN AUTO: 30.8 PG (ref 24–34)
MCHC RBC AUTO-ENTMCNC: 32.8 G/DL (ref 31–37)
MCV RBC AUTO: 94 FL (ref 74–97)
PLATELET # BLD AUTO: 201 K/UL (ref 135–420)
PMV BLD AUTO: 9.9 FL (ref 9.2–11.8)
POTASSIUM SERPL-SCNC: 3.5 MMOL/L (ref 3.5–5.5)
RBC # BLD AUTO: 4.15 M/UL (ref 4.2–5.3)
SODIUM SERPL-SCNC: 140 MMOL/L (ref 136–145)
WBC # BLD AUTO: 4.9 K/UL (ref 4.6–13.2)

## 2019-01-11 PROCEDURE — 85027 COMPLETE CBC AUTOMATED: CPT

## 2019-01-11 PROCEDURE — 80048 BASIC METABOLIC PNL TOTAL CA: CPT

## 2019-01-11 PROCEDURE — 36415 COLL VENOUS BLD VENIPUNCTURE: CPT

## 2019-01-14 LAB
FAX TO INFO,FAXT: NORMAL
FAX TO NUMBER,FAXN: NORMAL

## 2019-01-22 PROBLEM — G56.01 CARPAL TUNNEL SYNDROME OF RIGHT WRIST: Chronic | Status: ACTIVE | Noted: 2019-01-22

## 2019-01-22 NOTE — H&P
History and Physical 
 
 
 
Patient: Berna Hirsch               Sex: female          DOA: (Not on file) YOB: 1945      Age:  75 y.o.        LOS:  LOS: 0 days HPI:  
 
Taylor Pickett is here status post her left ECTR with known right carpal tunnel syndrome with mild left second trigger finger/stiffness/flexor tendon tightness. She is here mainly for the right hand. The left hand is doing well with the exception of the mild trigger finger and stiffness. Regarding her right hand, she has known moderate to severe carpal tunnel syndrome. She is having night pain. She says it feels like the left one did prior to her carpal tunnel release. She is ready to consider surgical intervention. She is hesitant to do a trigger finger injection because of the pain. X-rays of the bilateral wrists reviewed from outside show mild evidence of 1st CMC degenerative joint disease. Past Medical History:  
Diagnosis Date  Arthritis  Heart murmur   
 congenital  
 Hypercholesteremia  Hypertension   
 early age 45s  Ill-defined condition Heart murmur, dx years ago. followed by Cardio. , last seen last month.  Menopause Ag 57 Past Surgical History:  
Procedure Laterality Date  HX CHOLECYSTECTOMY  HX GYN    
 ovarian cyst right removed  HX GYN    
 uterine polyp removed  HX ORTHOPAEDIC Left 2018  
 ctr No family history on file. Social History Socioeconomic History  Marital status:  Spouse name: Not on file  Number of children: Not on file  Years of education: Not on file  Highest education level: Not on file Tobacco Use  Smoking status: Former Smoker  Smokeless tobacco: Never Used Substance and Sexual Activity  Alcohol use: Yes Alcohol/week: 0.6 oz Types: 1 Glasses of wine per week Comment: rare  Drug use: No  
 
 
Prior to Admission medications Medication Sig Start Date End Date Taking? Authorizing Provider Omega-3 Fatty Acids (FISH OIL) 500 mg cap Take  by mouth daily. Provider, Historical  
chlorthalidone (HYGROTEN) 25 mg tablet nightly. Indications: hypertension 4/30/18   Provider, Historical  
ezetimibe (ZETIA) 10 mg tablet nightly. Indications: hypercholesterolemia 4/29/18   Provider, Historical  
fosinopril (MONOPRIL) 20 mg tablet nightly. Indications: hypertension 5/31/18   Provider, Historical  
multivitamin, tx-iron-ca-min (THERA-M W/ IRON) 9 mg iron-400 mcg tab tablet Take 1 Tab by mouth daily. Provider, Historical  
glucosamine-chondroit-vit c-mn (GLUCOSAMINE 1500 COMPLEX) 500-400 mg capsule Take  by mouth daily. Provider, Historical  
aspirin delayed-release 81 mg tablet Take  by mouth daily. Provider, Historical  
 
 
Allergies Allergen Reactions  Erythromycin Other (comments) GI upset severe  Keflex [Cephalexin] Other (comments) GI upset severe Review of Systems Pertinent positives include heart murmur, joint stiffness and numbness/tingling.   Pertinent negatives include blurred vision, chest pain, chills, cold, discharge of the eyes, dizziness, double vision, fever, headache, hearing loss, itching of the eyes, palpitations, redness of the eyes, rheumatic fever, ringing in ears, sore throat/hoarseness, weight change, abdominal pain, anxiety, bipolar disorder, bladder/kidney infection, bloody stool, blood in urine, burning sensation, changes in mood, chronic cough, depression, diarrhea, difficulty breathing, difficulty swallowing, fainting, frequent urinating, fracture/dislocation, gas/bloating, gout, hemorrhoids, incontinence, joint pain, loss of balance, memory loss, muscle weakness, nausea/vomiting, pain on breathing, painful urination, psoriasis, rash/itching, Raynaud's phenomenon, rheumatoid disease, seizure disorder, shortness of breath, sprain/strain, swelling of feet, tendonitis, varicose veins and wheezing. Physical Exam:  
  
There were no vitals taken for this visit. Physical Exam: 
 Physical examination of the patients right hand shows she has a positive Tinel sign at the carpal tunnel. The right wrist demonstrates no obvious swelling, ecchymosis, or wounds noted. There is no tenderness to palpation anywhere within the wrist or hand. The patient has normal motion in all six directions. The patient has a negative Phalen, and direct carpal compression tests. The patient has a negative Finkelstein maneuver. The patient has good capillary refill. The patient has good  strength of the hand with normal thenar eminence tone and normal light-touch sensation at the tip of each digit. Physical examination of the patients left hand shows she has some mild tenderness to palpation of the 2nd A1 pulley with some mild stiffness. The wrist demonstrates no obvious swelling, ecchymosis, or wounds noted. The patient has a negative Tinel, Phalen, and direct carpal compression tests. The patient has a negative Finkelstein maneuver. The patient has good capillary refill. The patient has good  strength of the hand with normal thenar eminence tone and normal light-touch sensation at the tip of each digit. Assessment/Plan Principal Problem: 
  Carpal tunnel syndrome of right wrist (1/22/2019) Active Problems: 
  HTN (hypertension) (6/26/2018) High cholesterol (6/26/2018) The patient was seen and examined by Dr. Elijah Molina and the plan was discussed with Dr. Elijah Molina. She is going to be scheduled for a right endoscopic carpal tunnel release and left 2nd trigger finger injection at the same time.   She understands the risks associated with the procedure including blood clots, anesthetic reaction, infection, neurovascular compromise, myotendinous injury, need for future surgery amongst others including bleeding. She has had it done on the other side. Again, we will inject the 2nd trigger finger at the time. She was not issued a prescription for Norco as she does not require it. Pain medications \"do not seem to work\" for her. She will ice and elevate. She will follow up with Dr. Amanda Ruiz about a week after the surgery.

## 2019-01-23 RX ORDER — SODIUM CHLORIDE 0.9 % (FLUSH) 0.9 %
5-40 SYRINGE (ML) INJECTION AS NEEDED
Status: CANCELLED | OUTPATIENT
Start: 2019-01-23

## 2019-01-23 RX ORDER — SODIUM CHLORIDE 0.9 % (FLUSH) 0.9 %
5-40 SYRINGE (ML) INJECTION EVERY 8 HOURS
Status: CANCELLED | OUTPATIENT
Start: 2019-01-23

## 2019-01-24 ENCOUNTER — HOSPITAL ENCOUNTER (OUTPATIENT)
Age: 74
Setting detail: OUTPATIENT SURGERY
Discharge: HOME | End: 2019-01-24
Attending: ORTHOPAEDIC SURGERY | Admitting: ORTHOPAEDIC SURGERY
Payer: MEDICARE

## 2019-01-24 ENCOUNTER — ANESTHESIA EVENT (OUTPATIENT)
Dept: SURGERY | Age: 74
End: 2019-01-24
Payer: MEDICARE

## 2019-01-24 ENCOUNTER — ANESTHESIA (OUTPATIENT)
Dept: SURGERY | Age: 74
End: 2019-01-24
Payer: MEDICARE

## 2019-01-24 VITALS
HEIGHT: 67 IN | TEMPERATURE: 98.2 F | OXYGEN SATURATION: 95 % | HEART RATE: 61 BPM | RESPIRATION RATE: 16 BRPM | WEIGHT: 175.06 LBS | SYSTOLIC BLOOD PRESSURE: 122 MMHG | BODY MASS INDEX: 27.48 KG/M2 | DIASTOLIC BLOOD PRESSURE: 68 MMHG

## 2019-01-24 DIAGNOSIS — G56.01 CARPAL TUNNEL SYNDROME OF RIGHT WRIST: Primary | Chronic | ICD-10-CM

## 2019-01-24 PROCEDURE — 77030020782 HC GWN BAIR PAWS FLX 3M -B: Performed by: ORTHOPAEDIC SURGERY

## 2019-01-24 PROCEDURE — 74011250636 HC RX REV CODE- 250/636

## 2019-01-24 PROCEDURE — 77030032490 HC SLV COMPR SCD KNE COVD -B: Performed by: ORTHOPAEDIC SURGERY

## 2019-01-24 PROCEDURE — 74011250636 HC RX REV CODE- 250/636: Performed by: ORTHOPAEDIC SURGERY

## 2019-01-24 PROCEDURE — 74011250636 HC RX REV CODE- 250/636: Performed by: PHYSICIAN ASSISTANT

## 2019-01-24 PROCEDURE — 76210000006 HC OR PH I REC 0.5 TO 1 HR: Performed by: ORTHOPAEDIC SURGERY

## 2019-01-24 PROCEDURE — 76060000031 HC ANESTHESIA FIRST 0.5 HR: Performed by: ORTHOPAEDIC SURGERY

## 2019-01-24 PROCEDURE — 76010000154 HC OR TIME FIRST 0.5 HR: Performed by: ORTHOPAEDIC SURGERY

## 2019-01-24 PROCEDURE — 74011250636 HC RX REV CODE- 250/636: Performed by: SPECIALIST

## 2019-01-24 PROCEDURE — 74011000250 HC RX REV CODE- 250: Performed by: ORTHOPAEDIC SURGERY

## 2019-01-24 PROCEDURE — 76210000020 HC REC RM PH II FIRST 0.5 HR: Performed by: ORTHOPAEDIC SURGERY

## 2019-01-24 PROCEDURE — 77030009770 HC INSTRU CRPL SET CNMD -C: Performed by: ORTHOPAEDIC SURGERY

## 2019-01-24 PROCEDURE — 77030000032 HC CUF TRNQT ZIMM -B: Performed by: ORTHOPAEDIC SURGERY

## 2019-01-24 RX ORDER — HYDROMORPHONE HYDROCHLORIDE 2 MG/ML
0.5 INJECTION, SOLUTION INTRAMUSCULAR; INTRAVENOUS; SUBCUTANEOUS
Status: DISCONTINUED | OUTPATIENT
Start: 2019-01-24 | End: 2019-01-24 | Stop reason: HOSPADM

## 2019-01-24 RX ORDER — MIDAZOLAM HYDROCHLORIDE 1 MG/ML
INJECTION, SOLUTION INTRAMUSCULAR; INTRAVENOUS AS NEEDED
Status: DISCONTINUED | OUTPATIENT
Start: 2019-01-24 | End: 2019-01-24 | Stop reason: HOSPADM

## 2019-01-24 RX ORDER — FENTANYL CITRATE 50 UG/ML
INJECTION, SOLUTION INTRAMUSCULAR; INTRAVENOUS AS NEEDED
Status: DISCONTINUED | OUTPATIENT
Start: 2019-01-24 | End: 2019-01-24 | Stop reason: HOSPADM

## 2019-01-24 RX ORDER — SODIUM CHLORIDE, SODIUM LACTATE, POTASSIUM CHLORIDE, CALCIUM CHLORIDE 600; 310; 30; 20 MG/100ML; MG/100ML; MG/100ML; MG/100ML
50 INJECTION, SOLUTION INTRAVENOUS CONTINUOUS
Status: DISCONTINUED | OUTPATIENT
Start: 2019-01-24 | End: 2019-01-24 | Stop reason: HOSPADM

## 2019-01-24 RX ORDER — FENTANYL CITRATE 50 UG/ML
25 INJECTION, SOLUTION INTRAMUSCULAR; INTRAVENOUS
Status: ACTIVE | OUTPATIENT
Start: 2019-01-24 | End: 2019-01-24

## 2019-01-24 RX ORDER — PHENYLEPHRINE HCL IN 0.9% NACL 1 MG/10 ML
SYRINGE (ML) INTRAVENOUS AS NEEDED
Status: DISCONTINUED | OUTPATIENT
Start: 2019-01-24 | End: 2019-01-24 | Stop reason: HOSPADM

## 2019-01-24 RX ORDER — KETOROLAC TROMETHAMINE 30 MG/ML
INJECTION, SOLUTION INTRAMUSCULAR; INTRAVENOUS AS NEEDED
Status: DISCONTINUED | OUTPATIENT
Start: 2019-01-24 | End: 2019-01-24 | Stop reason: HOSPADM

## 2019-01-24 RX ORDER — ONDANSETRON 2 MG/ML
4 INJECTION INTRAMUSCULAR; INTRAVENOUS ONCE
Status: DISCONTINUED | OUTPATIENT
Start: 2019-01-24 | End: 2019-01-24 | Stop reason: HOSPADM

## 2019-01-24 RX ORDER — NALOXONE HYDROCHLORIDE 0.4 MG/ML
0.1 INJECTION, SOLUTION INTRAMUSCULAR; INTRAVENOUS; SUBCUTANEOUS
Status: DISCONTINUED | OUTPATIENT
Start: 2019-01-24 | End: 2019-01-24 | Stop reason: HOSPADM

## 2019-01-24 RX ORDER — LIDOCAINE HYDROCHLORIDE 20 MG/ML
INJECTION, SOLUTION EPIDURAL; INFILTRATION; INTRACAUDAL; PERINEURAL AS NEEDED
Status: DISCONTINUED | OUTPATIENT
Start: 2019-01-24 | End: 2019-01-24 | Stop reason: HOSPADM

## 2019-01-24 RX ORDER — METHYLPREDNISOLONE ACETATE 40 MG/ML
INJECTION, SUSPENSION INTRA-ARTICULAR; INTRALESIONAL; INTRAMUSCULAR; SOFT TISSUE AS NEEDED
Status: DISCONTINUED | OUTPATIENT
Start: 2019-01-24 | End: 2019-01-24 | Stop reason: HOSPADM

## 2019-01-24 RX ORDER — SODIUM CHLORIDE, SODIUM LACTATE, POTASSIUM CHLORIDE, CALCIUM CHLORIDE 600; 310; 30; 20 MG/100ML; MG/100ML; MG/100ML; MG/100ML
125 INJECTION, SOLUTION INTRAVENOUS CONTINUOUS
Status: DISCONTINUED | OUTPATIENT
Start: 2019-01-24 | End: 2019-01-24 | Stop reason: HOSPADM

## 2019-01-24 RX ORDER — HYDROCODONE BITARTRATE AND ACETAMINOPHEN 5; 325 MG/1; MG/1
1-2 TABLET ORAL
Qty: 10 TAB | Refills: 0 | Status: SHIPPED | OUTPATIENT
Start: 2019-01-24

## 2019-01-24 RX ORDER — PROPOFOL 10 MG/ML
INJECTION, EMULSION INTRAVENOUS AS NEEDED
Status: DISCONTINUED | OUTPATIENT
Start: 2019-01-24 | End: 2019-01-24 | Stop reason: HOSPADM

## 2019-01-24 RX ADMIN — KETOROLAC TROMETHAMINE 30 MG: 30 INJECTION, SOLUTION INTRAMUSCULAR; INTRAVENOUS at 14:57

## 2019-01-24 RX ADMIN — LIDOCAINE HYDROCHLORIDE 100 MG: 20 INJECTION, SOLUTION EPIDURAL; INFILTRATION; INTRACAUDAL; PERINEURAL at 14:36

## 2019-01-24 RX ADMIN — Medication 100 MCG: at 14:39

## 2019-01-24 RX ADMIN — FENTANYL CITRATE 50 MCG: 50 INJECTION, SOLUTION INTRAMUSCULAR; INTRAVENOUS at 14:30

## 2019-01-24 RX ADMIN — SODIUM CHLORIDE, SODIUM LACTATE, POTASSIUM CHLORIDE, AND CALCIUM CHLORIDE 125 ML/HR: 600; 310; 30; 20 INJECTION, SOLUTION INTRAVENOUS at 09:34

## 2019-01-24 RX ADMIN — MIDAZOLAM HYDROCHLORIDE 2 MG: 1 INJECTION, SOLUTION INTRAMUSCULAR; INTRAVENOUS at 14:35

## 2019-01-24 RX ADMIN — Medication 100 MCG: at 14:41

## 2019-01-24 RX ADMIN — SODIUM CHLORIDE, SODIUM LACTATE, POTASSIUM CHLORIDE, AND CALCIUM CHLORIDE 50 ML/HR: 600; 310; 30; 20 INJECTION, SOLUTION INTRAVENOUS at 15:30

## 2019-01-24 RX ADMIN — FENTANYL CITRATE 50 MCG: 50 INJECTION, SOLUTION INTRAMUSCULAR; INTRAVENOUS at 14:35

## 2019-01-24 RX ADMIN — PROPOFOL 150 MG: 10 INJECTION, EMULSION INTRAVENOUS at 14:36

## 2019-01-24 NOTE — PERIOP NOTES
Bam PIKE at the bed side aware of patient bleeding from surgical site, dressing changed with 4x4, ace warp.

## 2019-01-24 NOTE — DISCHARGE INSTRUCTIONS
Ethelyn Moose, III, MD Bernardine Duverney, PA-C    Upper Extremity Surgery  Discharge Instructions      Please take the time to review the following instructions before you leave the hospital and use them as guidelines during your recovery from surgery. If you have any questions you may contact my office at [phone]. Wound Care/Dressing Changes:    []   You may remove the bulky dressing two days after surgery. Once you remove this, no dressing is necessary if there is no drainage. [x]   You may change your dressing as needed. Beginning the 2 days after you are discharged from the hospital you should change your dressing daily. A big, bulky dressing isnt necessary as long as there is any drainage from the incisions. You can put a band-aid or a piece of gauze over each incision and wear an ACE bandage as needed for comfort and swelling. []   Dont remove your dressing or get them wet.  It isnt necessary to apply antibiotic ointment to your incisions. Sutures will be removed at your one week post-op visit. Staples (if you have them) are removed in two weeks. If you have steri-strips over your incision they will start to peel off in 7-10 days as you get them wet. They dont need to be removed prior to that. When they begin to peel off, you may remove them. They should all be removed by 14 days from your surgery. Showering/Bathing:    [x]   You may shower 2 days after your surgery. Your dressing may be removed for showering. You may get your incisions wet in the shower. Dont vigorously scrub your incisions. Apply a clean, dry dressing after you have dried your incisions. Do not take a bath or get into a swimming pool or Jacuzzi until the incisions are completely healed. This may take about 14 days. Do not soak your incision under water. Sling:    []   You are not required to wear your sling and should do so only as needed for comfort.  You have no restrictions with regards to the movement of your shoulder. Please push to achieve full range of motion as soon as possible. You may resume your normal daily activities immediately and return to work as soon as you feel appropriate.    []   Keep your arm in the immobilizer at all times except when showering and changing your clothes. When showering or changing, keep your arm at your side. Dont move it away from your body. []   Keep your arm in the immobilizer at all times except when showering, changing your clothes and doing the exercises shown to you by Dr. Rupa Phillips or your physical therapist prior to your discharge from the hospital.  Keep your arm at your side when changing your clothes and showering. Dont move it away from your body. Ice/Elevation    Continue ice consistently for 48 hours after surgery. After 48 hours, you should ice your shoulder 3 times per day, for 20 minutes at a time for the next 5 days. After one week from surgery, you may use ice as needed for pain and swelling. Diet:    You may advance to your regular diet as tolerated. Medication:    1. You will be given a prescription for pain medication when you are discharged from the hospital.  Take the medication as needed according to the directions on the prescription bottle. Possible side effects of the medication include dizziness, headache, nausea, vomiting, constipation and urinary retention. If you experience any of these side effects call the office so that we can assist you in relieving them. Discontinue the use of the pain medication if you develop itching, rash, shortness of breath or difficulties swallowing. If these symptoms become severe or arent relieved by discontinuing the medication you should seek immediate medical attention. Refills of pain medication are authorized during office hours only (8 AM-5PM Mon. thru Fri.).    2. If you were prescribed Percoset/oxycodone or Dilaudid/hydromorphone you must have a written prescription. These medications legally cannot be called in to the pharmacy. 3. You may take over the counter Ibuprofen/Advil/Aleve between dosages of your pain medication if needed. Do not take Tylenol in addition to your pain medication as most of the pain medication already contains Tylenol. Do not exceed 3000mg of Tylenol per day. Ex: (hydrocodone 5/325g= 325mg of Tylenol)  4. You may resume the medication you were taking prior to surgery. Pain medication may change the effects of any antidepressant medication you are taking. If you have any questions about possible interactions between your regular medications and the pain medication you should consult the physician who prescribes your regular medications. Follow Up Appointment:  If you are unsure of your follow-up appointment date and time, please call [phone]. Please let our  know you are scheduling a post-op appointment. Most appointments should be between 7-14 days after your surgery. Physical Therapy:    []    If you already have a therapy appointment, please be sure to attend your sessions as scheduled. []   Physical Therapy will be discussed with you at your first follow-up appointment with Dr. Carmel Garcia. You dont need to begin physical therapy prior to that.    []  Begin physical therapy with your Home Health Physical Therapy. This will be set up         for your before you leave the hospital.    [x]  You do not require Physical Therapy. Important Signs and Symptoms:    If any of the following signs and symptoms occurs, you should contact Dr. Carmel Garcia office. Please be advised if a problem arises which you feel requires immediate medical attention or you are unable to contact Dr. Carmel Garcia office you should seek immediate medical attention. Signs and symptoms to watch for include:    1.  A sudden increase in swelling and/or redness or warmth at the area your surgery was performed which isnt relieved by rest, ice, and elevation. 2. Oral temperature greater than 101 degrees for 12 hours or more which isnt relieved by an increase in fluid intake and taking two Tylenol every 4-6 hours. 3. Excessive drainage from your incisions, or drainage which hasnt stopped by 72 hours after your surgery. 4. Fever, chills, shortness of breath, chest pain, nausea, vomiting or other signs and symptoms which are of concern to you. DISCHARGE SUMMARY from Nurse    PATIENT INSTRUCTIONS:    After general anesthesia or intravenous sedation, for 24 hours or while taking prescription Narcotics:  · Limit your activities  · Do not drive and operate hazardous machinery  · Do not make important personal or business decisions  · Do  not drink alcoholic beverages  · If you have not urinated within 8 hours after discharge, please contact your surgeon on call. Report the following to your surgeon:  · Excessive pain, swelling, redness or odor of or around the surgical area  · Temperature over 100.5  · Nausea and vomiting lasting longer than 4 hours or if unable to take medications  · Any signs of decreased circulation or nerve impairment to extremity: change in color, persistent  numbness, tingling, coldness or increase pain  · Any questions    What to do at Home:  Recommended activity: Activity as tolerated and no driving for today and Ambulate in house. If you experience any of the following symptoms as listed above, please follow up with Dr. Pennie Arredondo. *  Please give a list of your current medications to your Primary Care Provider. *  Please update this list whenever your medications are discontinued, doses are      changed, or new medications (including over-the-counter products) are added. *  Please carry medication information at all times in case of emergency situations.     These are general instructions for a healthy lifestyle:    No smoking/ No tobacco products/ Avoid exposure to second hand smoke  Surgeon General's Warning:  Quitting smoking now greatly reduces serious risk to your health. Obesity, smoking, and sedentary lifestyle greatly increases your risk for illness    A healthy diet, regular physical exercise & weight monitoring are important for maintaining a healthy lifestyle    You may be retaining fluid if you have a history of heart failure or if you experience any of the following symptoms:  Weight gain of 3 pounds or more overnight or 5 pounds in a week, increased swelling in our hands or feet or shortness of breath while lying flat in bed. Please call your doctor as soon as you notice any of these symptoms; do not wait until your next office visit. Recognize signs and symptoms of STROKE:    F-face looks uneven    A-arms unable to move or move unevenly    S-speech slurred or non-existent    T-time-call 911 as soon as signs and symptoms begin-DO NOT go       Back to bed or wait to see if you get better-TIME IS BRAIN. Warning Signs of HEART ATTACK     Call 911 if you have these symptoms:   Chest discomfort. Most heart attacks involve discomfort in the center of the chest that lasts more than a few minutes, or that goes away and comes back. It can feel like uncomfortable pressure, squeezing, fullness, or pain.  Discomfort in other areas of the upper body. Symptoms can include pain or discomfort in one or both arms, the back, neck, jaw, or stomach.  Shortness of breath with or without chest discomfort.  Other signs may include breaking out in a cold sweat, nausea, or lightheadedness. Don't wait more than five minutes to call 911 - MINUTES MATTER! Fast action can save your life. Calling 911 is almost always the fastest way to get lifesaving treatment. Emergency Medical Services staff can begin treatment when they arrive -- up to an hour sooner than if someone gets to the hospital by car. The discharge information has been reviewed with the patient and caregiver.   The patient and caregiver verbalized understanding. Discharge medications reviewed with the patient and caregiver and appropriate educational materials and side effects teaching were provided. Patient armband removed and shredded.     ___________________________________________________________________________________________________________________________________

## 2019-01-24 NOTE — OP NOTES
Single Incision Endoscopic Carpal Tunnel Release    Patient: Ninfa Mcgowan MRN: [id number]  CSN: [id number]   YOB: 1945  Age: 75 y.o. Sex: female      Date of Surgery:1/24/2019    PREOPERATIVE DIAGNOSES:  RIGHT CARPAL TUNNEL SYNDROME, LEFT SECOND TRIGGER FINGER      POSTOPERATIVE DIAGNOSES:  RIGHT CARPAL TUNNEL SYNDROME, LEFT SECOND TRIGGER FINGER    PROCEDURE: right Single Incision Endoscpoic Carpal Tunnel Release  left 2nd trigger finger cortisone injection      SURGEON: Delio Leyva MD    SPECIMEN TO PATHOLOGY:  * No specimens in log *    ESTIMATED BLOOD LOSS: none    FINDINGS:  Same     TOURNIQUET  TIME:   Approximately 5 minutes at 047 Hg     COMPLICATIONS:  None     ANESTHESIA:  General    INDICATIONS:  A 75y.o. year old female with known carpal tunnel syndrome documented by clinical exam and nerve conduction studies. The patient now presents for a ECTR. PROCEDURE: The patient was brought into the operating theater and after adequate prepping and draping of the surgical wrist and hand the limb was then exsanguinated with an Esmarch bandage and the tourniquet was inflated. .  A 1 cm transverse incision was placed at the volar flexion wrist crease just ulnar to the palmaris longus. The incision was deepened to the antebrachial fascia which was released the length of the wound and proximally for an additional 1 cm. Using the L[employer] carpal tunnel release kit, the carpal tunnel was dilated in line with the fourth metacarpal to the distal extent of the transverse carpal ligament. The cannula was then inserted and kept on some proximal radial deviation with the wrist in slight extension, and it was passed all the way to the cardinal line of Pichardo. The scope was then inserted with the wrist in slight extension and the undersurface of the transverse carpal ligament was seen easily from proximal to the distal fatty/adipose tissue at the end of the transverse carpal ligament.   Using the Aries on the [employer] and using the endoscope for visualization, the transverse carpal ligament was transected in two passes with good release of the ligament outside of view of the cannula. The nerve was never in harms way. The cannula was then withdrawn and placed back in the carpal tunnel with none of the prerelease tension. The wound was then closed with Mastisol on either side and then Steri-Strips were used to re-oppose the skin without the use of stitches. The skin and cut ends of the transverse carpal ligament were anesthetized with 4 to 5 mL of 0.25% Marcaine without epinephrine. This was dressed with two 4 x 4s and an Ace wrap. The tourniquet was deflated and removed, and the patient taken to the recovery room awake and in stable condition. All instrument, sponge and needle counts were correct.   The left 2nd trigger finger was injected sterilely with 1cc depomedrol and 3cc marcaine,     Kate Gould MD  1/24/2019

## 2019-01-24 NOTE — ANESTHESIA POSTPROCEDURE EVALUATION
Post-Anesthesia Evaluation and Assessment Cardiovascular Function/Vital Signs Visit Vitals /70 Pulse 66 Temp 36.7 °C (98 °F) Resp 16 Ht 5' 7\" (1.702 m) Wt 79.4 kg (175 lb 1 oz) SpO2 95% BMI 27.42 kg/m² Patient is status post Procedure(s): RIGHT ENDOSCOPIC CARPAL TUNNEL RELEASE 
LEFT SECOND TRIGGER FINGER INJECTION. Nausea/Vomiting: Controlled. Postoperative hydration reviewed and adequate. Pain: 
Pain Scale 1: Numeric (0 - 10) (01/24/19 1506) Pain Intensity 1: 0 (01/24/19 1506) Managed. Neurological Status:  
Neuro (WDL): Exceptions to WDL (01/24/19 1506) At baseline. Mental Status and Level of Consciousness: Arousable. Pulmonary Status:  
O2 Device: Non-rebreather mask (01/24/19 1506) Adequate oxygenation and airway patent. Complications related to anesthesia: None Post-anesthesia assessment completed. No concerns. Patient has met all discharge requirements. Signed By: Nathalia Vanessa CRNA January 24, 2019

## 2019-01-24 NOTE — PERIOP NOTES
AVS medication list reviewed and no duplicates noted. Discharge information reviewed with patient and spouse; verbalized understanding of discharge instructions and follow up.

## 2019-01-24 NOTE — ANESTHESIA PREPROCEDURE EVALUATION
Anesthetic History No history of anesthetic complications Review of Systems / Medical History Patient summary reviewed, nursing notes reviewed and pertinent labs reviewed Pulmonary Within defined limits Neuro/Psych Within defined limits Cardiovascular Hypertension: well controlled Hyperlipidemia Exercise tolerance: >4 METS 
  
GI/Hepatic/Renal 
Within defined limits Endo/Other Arthritis Other Findings Physical Exam 
 
Airway Mallampati: II 
TM Distance: 4 - 6 cm Neck ROM: normal range of motion Mouth opening: Normal 
 
 Cardiovascular Dental 
 
Dentition: Full upper dentures and Caps/crowns Pulmonary Abdominal 
 
 
 
 Other Findings Anesthetic Plan ASA: 2 Anesthesia type: general 
 
 
 
 
Induction: Intravenous Anesthetic plan and risks discussed with: Patient and Spouse

## 2019-08-02 ENCOUNTER — HOSPITAL ENCOUNTER (OUTPATIENT)
Dept: MAMMOGRAPHY | Age: 74
Discharge: HOME | End: 2019-08-02
Attending: FAMILY MEDICINE
Payer: MEDICARE

## 2019-08-02 DIAGNOSIS — Z12.31 VISIT FOR SCREENING MAMMOGRAM: ICD-10-CM

## 2019-08-02 PROCEDURE — 77063 BREAST TOMOSYNTHESIS BI: CPT

## 2019-12-14 NOTE — INTERVAL H&P NOTE
H&P Update: 
Erica Zheng was seen and examined. History and physical has been reviewed. The patient has been examined. There have been no significant clinical changes since the completion of the originally dated History and Physical. 
Patient identified by surgeon; surgical site was confirmed by patient and surgeon.  
 
Signed By: Divya Johansen MD   
 January 24, 2019 1:40 PM   
 

Katerin Marquez(Attending)

## 2020-09-23 ENCOUNTER — HOSPITAL ENCOUNTER (OUTPATIENT)
Dept: MAMMOGRAPHY | Age: 75
Discharge: HOME | End: 2020-09-23
Attending: FAMILY MEDICINE
Payer: MEDICARE

## 2020-09-23 DIAGNOSIS — Z12.31 VISIT FOR SCREENING MAMMOGRAM: ICD-10-CM

## 2020-09-23 PROCEDURE — 77063 BREAST TOMOSYNTHESIS BI: CPT

## 2022-03-18 PROBLEM — G56.01 CARPAL TUNNEL SYNDROME OF RIGHT WRIST: Status: ACTIVE | Noted: 2019-01-22

## 2022-03-18 PROBLEM — E78.00 HIGH CHOLESTEROL: Status: ACTIVE | Noted: 2018-06-26

## 2022-03-18 PROBLEM — G56.02 CARPAL TUNNEL SYNDROME OF LEFT WRIST: Status: ACTIVE | Noted: 2018-06-26

## 2022-03-19 PROBLEM — I10 HTN (HYPERTENSION): Status: ACTIVE | Noted: 2018-06-26

## 2023-11-27 ENCOUNTER — HOSPITAL ENCOUNTER (OUTPATIENT)
Facility: HOSPITAL | Age: 78
Discharge: HOME | End: 2023-11-30
Attending: FAMILY MEDICINE
Payer: MEDICARE

## 2023-11-27 DIAGNOSIS — Z78.0 ASYMPTOMATIC MENOPAUSAL STATE: ICD-10-CM

## 2023-11-27 PROCEDURE — 77080 DXA BONE DENSITY AXIAL: CPT

## (undated) DEVICE — STERILE POLYISOPRENE POWDER-FREE SURGICAL GLOVES WITH EMOLLIENT COATING: Brand: PROTEXIS

## (undated) DEVICE — PACK PROCEDURE SURG EXTREMITY CUST

## (undated) DEVICE — NEEDLE HYPO 25GA L1.5IN BLU POLYPR HUB S STL REG BVL STR

## (undated) DEVICE — MASTISOL ADHESIVE LIQ 2/3ML

## (undated) DEVICE — (D)PREP SKN CHLRAPRP APPL 26ML -- CONVERT TO ITEM 371833

## (undated) DEVICE — ZIMMER® STERILE DISPOSABLE TOURNIQUET CUFF WITH PROTECTIVE SLEEVE AND PLC, SINGLE PORT, SINGLE BLADDER, 18 IN. (46 CM)

## (undated) DEVICE — SKIN MARKER,REGULAR TIP WITH RULER AND LABELS: Brand: DEVON

## (undated) DEVICE — CTS RELIEF KIT, CARPAL TUNNEL SYNDROME RELIEF KIT: Brand: CTS RELIEF KIT

## (undated) DEVICE — STERILE POLYISOPRENE POWDER-FREE SURGICAL GLOVES: Brand: PROTEXIS

## (undated) DEVICE — BANDAGE COMPR W4INXL5YD ELAS CLP CLSR

## (undated) DEVICE — NEEDLE HYPO 25GA L1.5IN BVL ORIENTED ECLIPSE

## (undated) DEVICE — REM POLYHESIVE ADULT PATIENT RETURN ELECTRODE: Brand: VALLEYLAB

## (undated) DEVICE — DEVON™ KNEE AND BODY STRAP 60" X 3" (1.5 M X 7.6 CM): Brand: DEVON

## (undated) DEVICE — KENDALL SCD EXPRESS SLEEVES, KNEE LENGTH, MEDIUM: Brand: KENDALL SCD

## (undated) DEVICE — (D)STRIP SKN CLSR 0.5X4IN WHT --

## (undated) DEVICE — BANDAGE COMPR W3XL186IN SYNTH KNIT DSGN COHESIVE ELAS ECON

## (undated) DEVICE — SPONGE GZ W4XL4IN COT 12 PLY TYP VII WVN C FLD DSGN

## (undated) DEVICE — BANDAGE COMPR 9 FTX4 IN SMOOTH COMFORTABLE SYNTH ESMRK LF